# Patient Record
Sex: FEMALE | Race: ASIAN | NOT HISPANIC OR LATINO | URBAN - METROPOLITAN AREA
[De-identification: names, ages, dates, MRNs, and addresses within clinical notes are randomized per-mention and may not be internally consistent; named-entity substitution may affect disease eponyms.]

---

## 2017-12-27 ENCOUNTER — EMERGENCY (EMERGENCY)
Facility: HOSPITAL | Age: 47
LOS: 1 days | Discharge: HOME | End: 2017-12-27
Attending: EMERGENCY MEDICINE | Admitting: EMERGENCY MEDICINE
Payer: MEDICAID

## 2017-12-27 VITALS
SYSTOLIC BLOOD PRESSURE: 140 MMHG | HEART RATE: 88 BPM | DIASTOLIC BLOOD PRESSURE: 78 MMHG | RESPIRATION RATE: 14 BRPM | OXYGEN SATURATION: 98 % | TEMPERATURE: 99 F

## 2017-12-27 VITALS
HEART RATE: 96 BPM | TEMPERATURE: 98 F | HEIGHT: 63 IN | WEIGHT: 186.95 LBS | DIASTOLIC BLOOD PRESSURE: 88 MMHG | SYSTOLIC BLOOD PRESSURE: 152 MMHG | RESPIRATION RATE: 15 BRPM | OXYGEN SATURATION: 99 %

## 2017-12-27 PROCEDURE — 99284 EMERGENCY DEPT VISIT MOD MDM: CPT

## 2017-12-27 PROCEDURE — 99283 EMERGENCY DEPT VISIT LOW MDM: CPT | Mod: 25

## 2017-12-27 PROCEDURE — 73502 X-RAY EXAM HIP UNI 2-3 VIEWS: CPT | Mod: 26,LT

## 2017-12-27 PROCEDURE — 73502 X-RAY EXAM HIP UNI 2-3 VIEWS: CPT

## 2017-12-27 RX ORDER — OXYCODONE AND ACETAMINOPHEN 5; 325 MG/1; MG/1
2 TABLET ORAL ONCE
Qty: 0 | Refills: 0 | Status: DISCONTINUED | OUTPATIENT
Start: 2017-12-27 | End: 2017-12-27

## 2017-12-27 RX ADMIN — OXYCODONE AND ACETAMINOPHEN 2 TABLET(S): 5; 325 TABLET ORAL at 10:24

## 2017-12-27 NOTE — ED ADULT NURSE NOTE - OBJECTIVE STATEMENT
pt aox4, " lt hip area pain for a few days" FROM, good cap refill, denies injury, Hx back problems, on tramadol

## 2017-12-27 NOTE — ED PROVIDER NOTE - CONSTITUTIONAL, MLM
normal... Uncomfortable appearing female, well nourished, awake, alert, oriented to person, place, time/situation and in mild apparent distress.

## 2017-12-27 NOTE — ED ADULT NURSE NOTE - CHIEF COMPLAINT QUOTE
pt with left hip pain, unable to move leg, difficulty walking. deny trauma to hip. pain started last night, history of lumbar pain

## 2017-12-27 NOTE — ED PROVIDER NOTE - OBJECTIVE STATEMENT
48 yo female with one day of aching left hip pain which increases with movement and palpation. No fall and no trauma. No fever or chills. Pain increased with movement and weight bearing.

## 2019-07-16 ENCOUNTER — EMERGENCY (EMERGENCY)
Facility: HOSPITAL | Age: 49
LOS: 1 days | Discharge: HOME | End: 2019-07-16
Attending: EMERGENCY MEDICINE | Admitting: EMERGENCY MEDICINE
Payer: MEDICAID

## 2019-07-16 VITALS
SYSTOLIC BLOOD PRESSURE: 145 MMHG | HEART RATE: 83 BPM | WEIGHT: 190.04 LBS | OXYGEN SATURATION: 99 % | TEMPERATURE: 99 F | RESPIRATION RATE: 17 BRPM | DIASTOLIC BLOOD PRESSURE: 85 MMHG

## 2019-07-16 LAB
ALBUMIN SERPL ELPH-MCNC: 3.8 G/DL — SIGNIFICANT CHANGE UP (ref 3.3–5)
ALP SERPL-CCNC: 90 U/L — SIGNIFICANT CHANGE UP (ref 40–120)
ALT FLD-CCNC: 20 U/L — SIGNIFICANT CHANGE UP (ref 12–78)
ANION GAP SERPL CALC-SCNC: 7 MMOL/L — SIGNIFICANT CHANGE UP (ref 5–17)
AST SERPL-CCNC: 19 U/L — SIGNIFICANT CHANGE UP (ref 15–37)
BILIRUB SERPL-MCNC: 0.3 MG/DL — SIGNIFICANT CHANGE UP (ref 0.2–1.2)
BUN SERPL-MCNC: 10 MG/DL — SIGNIFICANT CHANGE UP (ref 7–23)
CALCIUM SERPL-MCNC: 9.1 MG/DL — SIGNIFICANT CHANGE UP (ref 8.5–10.1)
CHLORIDE SERPL-SCNC: 109 MMOL/L — HIGH (ref 96–108)
CK SERPL-CCNC: 167 U/L — SIGNIFICANT CHANGE UP (ref 26–192)
CO2 SERPL-SCNC: 25 MMOL/L — SIGNIFICANT CHANGE UP (ref 22–31)
CREAT SERPL-MCNC: 0.86 MG/DL — SIGNIFICANT CHANGE UP (ref 0.5–1.3)
D DIMER BLD IA.RAPID-MCNC: <150 NG/ML DDU — SIGNIFICANT CHANGE UP
GLUCOSE SERPL-MCNC: 107 MG/DL — HIGH (ref 70–99)
HCG SERPL-ACNC: <1 MIU/ML — SIGNIFICANT CHANGE UP
HCT VFR BLD CALC: 38.9 % — SIGNIFICANT CHANGE UP (ref 34.5–45)
HGB BLD-MCNC: 12.4 G/DL — SIGNIFICANT CHANGE UP (ref 11.5–15.5)
MCHC RBC-ENTMCNC: 25.7 PG — LOW (ref 27–34)
MCHC RBC-ENTMCNC: 31.9 GM/DL — LOW (ref 32–36)
MCV RBC AUTO: 80.7 FL — SIGNIFICANT CHANGE UP (ref 80–100)
NRBC # BLD: 0 /100 WBCS — SIGNIFICANT CHANGE UP (ref 0–0)
PLATELET # BLD AUTO: 286 K/UL — SIGNIFICANT CHANGE UP (ref 150–400)
POTASSIUM SERPL-MCNC: 4.4 MMOL/L — SIGNIFICANT CHANGE UP (ref 3.5–5.3)
POTASSIUM SERPL-SCNC: 4.4 MMOL/L — SIGNIFICANT CHANGE UP (ref 3.5–5.3)
PROT SERPL-MCNC: 8 G/DL — SIGNIFICANT CHANGE UP (ref 6–8.3)
RBC # BLD: 4.82 M/UL — SIGNIFICANT CHANGE UP (ref 3.8–5.2)
RBC # FLD: 13.2 % — SIGNIFICANT CHANGE UP (ref 10.3–14.5)
SODIUM SERPL-SCNC: 141 MMOL/L — SIGNIFICANT CHANGE UP (ref 135–145)
TROPONIN I SERPL-MCNC: <.015 NG/ML — SIGNIFICANT CHANGE UP (ref 0.01–0.04)
WBC # BLD: 6.62 K/UL — SIGNIFICANT CHANGE UP (ref 3.8–10.5)
WBC # FLD AUTO: 6.62 K/UL — SIGNIFICANT CHANGE UP (ref 3.8–10.5)

## 2019-07-16 PROCEDURE — 99285 EMERGENCY DEPT VISIT HI MDM: CPT

## 2019-07-16 PROCEDURE — 71046 X-RAY EXAM CHEST 2 VIEWS: CPT | Mod: 26

## 2019-07-16 PROCEDURE — 93010 ELECTROCARDIOGRAM REPORT: CPT

## 2019-07-16 PROCEDURE — 70450 CT HEAD/BRAIN W/O DYE: CPT | Mod: 26

## 2019-07-16 RX ORDER — ATORVASTATIN CALCIUM 80 MG/1
0 TABLET, FILM COATED ORAL
Qty: 0 | Refills: 0 | DISCHARGE

## 2019-07-16 RX ORDER — METFORMIN HYDROCHLORIDE 850 MG/1
0 TABLET ORAL
Qty: 0 | Refills: 0 | DISCHARGE

## 2019-07-16 RX ORDER — AMLODIPINE BESYLATE 2.5 MG/1
1 TABLET ORAL
Qty: 0 | Refills: 0 | DISCHARGE

## 2019-07-16 RX ORDER — LOSARTAN POTASSIUM 100 MG/1
1 TABLET, FILM COATED ORAL
Qty: 0 | Refills: 0 | DISCHARGE

## 2019-07-16 NOTE — ED PROVIDER NOTE - ENMT, MLM
Airway patent, Nasal mucosa clear. Mouth with normal mucosa. Throat has no vesicles, no oropharyngeal exudates and uvula is midline. no ext signs of head trauma. No spinal tend.

## 2019-07-16 NOTE — ED PROVIDER NOTE - CARE PROVIDER_API CALL
Orestes Verdin)  Cardiovascular Disease  [address] [place] [address]  Phone: [phone]  Fax: [phone]  Follow Up Time:

## 2019-07-16 NOTE — ED ADULT NURSE NOTE - OBJECTIVE STATEMENT
Patient alert and oriented X 3. Complaining of high blood pressure, headache and dizziness since 1730. Patient state she had a witnessed fall after feeling dizzy, Did not hit head. + right hand pain. Patient blood pressure after fall 180 systolic. Patient took a dose of 100 mg losartan and 10 mg amlodipine and went to urgent care. Patient state she feel like her heart is racing. Denies  shortness of breath, nausea, vomiting, and

## 2019-07-16 NOTE — ED ADULT NURSE NOTE - NSIMPLEMENTINTERV_GEN_ALL_ED
Implemented All Universal Safety Interventions:  [place] to call system. Call bell, personal items and telephone within reach. Instruct patient to call for assistance. Room bathroom lighting operational. Non-slip footwear when patient is off stretcher. Physically safe environment: no spills, clutter or unnecessary equipment. Stretcher in lowest position, wheels locked, appropriate side rails in place.

## 2019-07-16 NOTE — ED PROVIDER NOTE - PROGRESS NOTE DETAILS
Pt doing well, no acute co or changes. Will cont to monitor. seen by cardiology, reshma home, f/u as outpatient

## 2019-07-16 NOTE — ED PROVIDER NOTE - OBJECTIVE STATEMENT
50 yo F p/w was co headache tonight, had episode where she felt dizzy , fell. pt believes she passed out. Pt denies inj. No neck / back pain. pt co persistent mild headache. no n/v. no numb/ting/focal weak. No fever/chills/recent illness. no cp/sob. ? some palpitations during episode tonight. No prior episodes. no agg/allev factors. no other inj or co.  After episode , pt found she was hypertensive, SBP ~ 180. pt took an extra dose of her usual BP meds, not BP improved.

## 2019-07-17 VITALS
RESPIRATION RATE: 16 BRPM | TEMPERATURE: 98 F | OXYGEN SATURATION: 98 % | SYSTOLIC BLOOD PRESSURE: 118 MMHG | DIASTOLIC BLOOD PRESSURE: 78 MMHG

## 2019-07-17 PROBLEM — E78.5 HYPERLIPIDEMIA, UNSPECIFIED: Chronic | Status: ACTIVE | Noted: 2017-12-27

## 2019-07-17 PROBLEM — E11.9 TYPE 2 DIABETES MELLITUS WITHOUT COMPLICATIONS: Chronic | Status: ACTIVE | Noted: 2017-12-27

## 2019-07-17 PROBLEM — I10 ESSENTIAL (PRIMARY) HYPERTENSION: Chronic | Status: ACTIVE | Noted: 2017-12-27

## 2019-07-17 LAB
CK SERPL-CCNC: 128 U/L — SIGNIFICANT CHANGE UP (ref 26–192)
TROPONIN I SERPL-MCNC: <.015 NG/ML — SIGNIFICANT CHANGE UP (ref 0.01–0.04)

## 2019-07-17 PROCEDURE — 84702 CHORIONIC GONADOTROPIN TEST: CPT

## 2019-07-17 PROCEDURE — 80053 COMPREHEN METABOLIC PANEL: CPT

## 2019-07-17 PROCEDURE — 93005 ELECTROCARDIOGRAM TRACING: CPT

## 2019-07-17 PROCEDURE — 99285 EMERGENCY DEPT VISIT HI MDM: CPT

## 2019-07-17 PROCEDURE — 85027 COMPLETE CBC AUTOMATED: CPT

## 2019-07-17 PROCEDURE — 84484 ASSAY OF TROPONIN QUANT: CPT

## 2019-07-17 PROCEDURE — 70450 CT HEAD/BRAIN W/O DYE: CPT

## 2019-07-17 PROCEDURE — 36415 COLL VENOUS BLD VENIPUNCTURE: CPT

## 2019-07-17 PROCEDURE — 71046 X-RAY EXAM CHEST 2 VIEWS: CPT

## 2019-07-17 PROCEDURE — 99284 EMERGENCY DEPT VISIT MOD MDM: CPT | Mod: 25

## 2019-07-17 PROCEDURE — 82550 ASSAY OF CK (CPK): CPT

## 2019-07-17 PROCEDURE — 85379 FIBRIN DEGRADATION QUANT: CPT

## 2019-07-17 NOTE — CONSULT NOTE ADULT - SUBJECTIVE AND OBJECTIVE BOX
[facility]         Rick Anderson, Beni, Velvet, Yanira, Jocelyne Cano        [phone] (office)    Reason for Consult:    Interval HPI: Patient seen and examined at bedside. No acute events overnight.     HPI:  49 F PMH HTN, HLD, type II DM presents to ED s/p fall. Note, daughter present to assist with translation of Mohawk per patient preference. Patient states she was sleeping on the couch yesterday at 5:00 p.m. and she got up and she noticed her vision appeared dark, felt a discomfort in her chest and a severe headache. She went to the fridge to drink a glass of water and eat a small amount of watermelon when she felt lightheaded and fell to the floor. Patient denies LOC, admits to hitting her head. Denies incontinence, post-ictal but admits to worsening headache. At that time, daughter found her and measured blood pressure, finding SBP to be 180. She gave her home dose of amlodipine and losartan (10mg, 100 mg respectively) and took patient to Urgent Care who advised they come to the ED. Patient denies chest pain, SOB, peripheral edema, N/V, decrease PO intake. Patient notes she had similar symptoms before, the last of which being 2 years prior in [place] which resulted in a L broken ankle. [place] a cardiologist [place] per daughter 3 years ago due to extensive family history of CVD, but it's unclear if cardiac workup performed at that time.    PAST MEDICAL & SURGICAL HISTORY:  Hyperlipidemia  Hypertension  Diabetes  No significant past surgical history      SOCIAL HISTORY: No active tobacco, alcohol or illicit drug use    FAMILY HISTORY:  -Early onset stroke in 2 male siblings < 55  -MI in multiple female siblings, age unclear    Home Medications:  amLODIPine 10 mg oral tablet: 1 tab(s) orally once a day (16 Jul 2019 20:32)  atorvastatin:  (16 Jul 2019 20:32)  losartan 100 mg oral tablet: 1 tab(s) orally once a day (16 Jul 2019 20:32)  metFORMIN 500 mg oral tablet: orally once a day (16 Jul 2019 20:32)      MEDICATIONS  (STANDING):    MEDICATIONS  (PRN):      Allergies    No Known Allergies    Intolerances        REVIEW OF SYSTEMS:   Constitutional: denies fever, chills, diaphoresis   HEENT: admits blurry vision, denies eye pain, difficulty hearing  Respiratory: denies SOB, cough, sputum production  Cardiovascular: denies CP, edema admits palpitations  Gastrointestinal: denies nausea, vomiting, diarrhea, constipation, abdominal pain  Genitourinary: denies dysuria, frequency, urgency, hematuria   Neurologic: admits headache, dizziness denies paresthesias, numbness/tingling  Psychiatric: denies anxiety, depression      VITAL SIGNS:   Vital Signs Last 24 Hrs  T(C): 37.1 (17 Jul 2019 05:39), Max: 37.1 (17 Jul 2019 05:39)  T(F): 98.7 (17 Jul 2019 05:39), Max: 98.7 (17 Jul 2019 05:39)  HR: 80 (17 Jul 2019 05:39) (80 - 83)  BP: 125/74 (17 Jul 2019 05:39) (116/85 - 145/85)  BP(mean): --  RR: 16 (17 Jul 2019 05:39) (16 - 17)  SpO2: 97% (17 Jul 2019 05:39) (97% - 100%)    I&O's Summary      PHYSICAL EXAM:  Constitutional: NAD, well-developed  HEENT NC/AT, moist mucous membranes  Pulmonary: Non-labored, breath sounds are clear bilaterally, no wheezing, rales or rhonchi  Cardiovascular: +S1, S2, RRR, no murmur  Gastrointestinal: Soft, nontender, nondistended, normoactive bowel sounds  Extremities: No peripheral edema   Neurological: Alert, strength and sensitivity are grossly intact  Skin: warm, dry  Psych: Mood & affect appropriate    LABS: All Labs Reviewed:                        12.4   6.62  )-----------( 286      ( 16 Jul 2019 21:29 )             38.9     16 Jul 2019 21:29    141    |  109    |  10     ----------------------------<  107    4.4     |  25     |  0.86     Ca    9.1        16 Jul 2019 21:29    TPro  8.0    /  Alb  3.8    /  TBili  0.3    /  DBili  x      /  AST  19     /  ALT  20     /  AlkPhos  90     16 Jul 2019 21:29      CARDIAC MARKERS ( 17 Jul 2019 05:49 )  <.015 ng/mL / x     / 128 U/L / x     / x      CARDIAC MARKERS ( 16 Jul 2019 21:29 )  <.015 ng/mL / x     / 167 U/L / x     / x          Blood Culture:         EKG:  NSR, VR 74, no ST elevations/depressions. No T wave inversions, no prolonged intervals  RADIOLOGY:  CXR:  CXR personally reviewed and my interpretation is: grossly clear lungs, costophrenic angles without blunting [facility]         Rick Anderson, Beni, Velvet, Yanira, Jerome, Jocelyne        [phone] (office)    Reason for Consult:    Interval HPI: Patient seen and examined at bedside. No acute events overnight.     HPI:  49 F PMH HTN, HLD, type II DM presents to ED s/p fall. Note, daughter present to assist with translation of Thai per patient preference. Patient states she was sleeping on the couch yesterday at 5:00 p.m. which was unlike her to nap in the afternoon. She got up and she noticed her vision appeared dark, felt a discomfort in her chest and a severe headache. She went to the fridge to drink a glass of water and eat a small amount of watermelon when she felt lightheaded and fell to the floor. Patient denies LOC, admits to hitting her head. Denies incontinence, post-ictal but admits to worsening headache. At that time, daughter found her and measured blood pressure, finding SBP to be 180. She gave her home dose of amlodipine and losartan (10mg, 100 mg respectively) and took patient to Urgent Care who advised they come to the ED. Patient denies chest pain, SOB, peripheral edema, N/V, decrease PO intake. Patient notes she had similar symptoms three times before, the last of which being 8 months prior and also 2 years ago in [place] which resulted in a L broken ankle. She saw a cardiologist in [place] per daughter 3 years ago due to extensive family history of CVD, and cardiac work up performed at that time including stress test and holter monitor.   PAST MEDICAL & SURGICAL HISTORY:  Hyperlipidemia  Hypertension  Diabetes  No significant past surgical history      SOCIAL HISTORY: No active tobacco, alcohol or illicit drug use    FAMILY HISTORY:  -Early onset stroke in 2 male siblings < 55  -MI in multiple female siblings, age unclear    Home Medications:  amLODIPine 10 mg oral tablet: 1 tab(s) orally once a day (16 Jul 2019 20:32)  atorvastatin:  (16 Jul 2019 20:32)  losartan 100 mg oral tablet: 1 tab(s) orally once a day (16 Jul 2019 20:32)  metFORMIN 500 mg oral tablet: orally once a day (16 Jul 2019 20:32)      MEDICATIONS  (STANDING):    MEDICATIONS  (PRN):      Allergies    No Known Allergies    Intolerances        REVIEW OF SYSTEMS:   Constitutional: denies fever, chills, diaphoresis   HEENT: admits blurry vision, denies eye pain, difficulty hearing  Respiratory: denies SOB, cough, sputum production  Cardiovascular: denies CP, edema admits palpitations  Gastrointestinal: denies nausea, vomiting, diarrhea, constipation, abdominal pain  Genitourinary: denies dysuria, frequency, urgency, hematuria   Neurologic: admits headache, dizziness denies paresthesias, numbness/tingling  Psychiatric: denies anxiety, depression      VITAL SIGNS:   Vital Signs Last 24 Hrs  T(C): 37.1 (17 Jul 2019 05:39), Max: 37.1 (17 Jul 2019 05:39)  T(F): 98.7 (17 Jul 2019 05:39), Max: 98.7 (17 Jul 2019 05:39)  HR: 80 (17 Jul 2019 05:39) (80 - 83)  BP: 125/74 (17 Jul 2019 05:39) (116/85 - 145/85)  BP(mean): --  RR: 16 (17 Jul 2019 05:39) (16 - 17)  SpO2: 97% (17 Jul 2019 05:39) (97% - 100%)    I&O's Summary      PHYSICAL EXAM:  Constitutional: NAD, well-developed  HEENT NC/AT, moist mucous membranes  Pulmonary: Non-labored, breath sounds are clear bilaterally, no wheezing, rales or rhonchi  Cardiovascular: +S1, S2, RRR, no murmur  Gastrointestinal: Soft, nontender, nondistended, normoactive bowel sounds  Extremities: No peripheral edema   Neurological: Alert, strength and sensitivity are grossly intact  Skin: warm, dry  Psych: Mood & affect appropriate    LABS: All Labs Reviewed:                        12.4   6.62  )-----------( 286      ( 16 Jul 2019 21:29 )             38.9     16 Jul 2019 21:29    141    |  109    |  10     ----------------------------<  107    4.4     |  25     |  0.86     Ca    9.1        16 Jul 2019 21:29    TPro  8.0    /  Alb  3.8    /  TBili  0.3    /  DBili  x      /  AST  19     /  ALT  20     /  AlkPhos  90     16 Jul 2019 21:29      CARDIAC MARKERS ( 17 Jul 2019 05:49 )  <.015 ng/mL / x     / 128 U/L / x     / x      CARDIAC MARKERS ( 16 Jul 2019 21:29 )  <.015 ng/mL / x     / 167 U/L / x     / x          Blood Culture:         EKG:  NSR, VR 74, no ST elevations/depressions. No T wave inversions, no prolonged intervals  RADIOLOGY:  CXR:  CXR personally reviewed and my interpretation is: grossly clear lungs, costophrenic angles without blunting

## 2019-07-17 NOTE — ED ADULT NURSE REASSESSMENT NOTE - NS ED NURSE REASSESS COMMENT FT1
pt mentating at baseline denies any dizziness, headache . no change in neuro status. pt denies pain at this time. pt placed in position of comfort, given warm blankets. safety maintained. will continue to monitor.

## 2019-07-17 NOTE — CONSULT NOTE ADULT - ASSESSMENT
49 F PMH HTN, HLD, type II DM presents to ED s/p fall.    Patient admits to seeing cardiologist prior due to HTN, but uncertain if echo/stress test performed.       - Patient is not complaining of any cardiac symptoms at this time  - Troponin negative 2x, and given lack of significant cardiac findings, unlikely to be ischemic event.  - EKG normal. Give history, patient likely experienced acute orthostatic episode secondary to dehydration but cannot rule out arrythmia as patient has had multiple episodes over the past few years  - Patient to follow up in 2-3 weeks outpatient for further cardiologic assessment as stress test/halter monitor may be warranted  - No meaningful evidence of volume overload. 49 F PMH HTN, HLD, type II DM presents to ED s/p fall.    Patient admits to seeing cardiologist prior due to HTN, and patient received holter monitor and stress test. Unclear if results were abnormal at that time.       - Patient is not complaining of any cardiac symptoms at this time  - Troponin negative 2x, and given lack of significant cardiac findings, unlikely to be ischemic event.  - EKG normal. Give history, patient likely experienced acute orthostatic episode secondary to dehydration but cannot rule out arrythmia as patient has had multiple  episodes over the past few years  - Patient to follow up in 2-3 weeks outpatient for further cardiologic assessment as stress test/holter monitor may be warranted  - No meaningful evidence of volume overload. 49 F PMH HTN, HLD, type II DM presents to ED s/p fall.    Patient admits to seeing cardiologist prior due to HTN, and patient received holter monitor and stress test. Unclear if results were abnormal at that time.       - Patient is not complaining of any cardiac symptoms at this time  - Troponin negative 2x, and given lack of significant cardiac findings, unlikely to be ischemic event.  - EKG normal. Give history, patient likely experienced acute orthostatic episode secondary to dehydration but cannot rule out arrythmia as patient has had multiple  episodes over the past few years  - Will perform orthostatics in ED, if negative, patient cleared to follow up outpatient  - Patient to follow up in 2-3 weeks outpatient for further cardiologic assessment as stress test/holter monitor may be warranted  - No meaningful evidence of volume overload. 49 F PMH HTN, HLD, type II DM presents to ED s/p fall.    Patient admits to seeing cardiologist prior due to HTN, and patient received holter monitor and stress test. Unclear if results were abnormal at that time.     -there is no evidence of acute ischemia.  -there is no evidence of significant arrhythmia.  -there is no evidence for meaningful  volume overload.     - Patient is not complaining of any cardiac symptoms at this time  - Troponin negative 2x, and given lack of significant cardiac findings, unlikely to be ischemic event.  - EKG normal. Give history, patient likely experienced acute orthostatic episode secondary to dehydration but cannot rule out arrythmia as patient has had multiple  episodes over the past few years  - Will perform orthostatics in ED, if negative, patient cleared to follow up outpatient  - Patient to follow up in 2-3 weeks outpatient for further cardiologic assessment, though noting a prior eval for these sxs, the yield of additional testing is likely low

## 2019-10-25 NOTE — ED PROVIDER NOTE - CPE EDP RESP NORM
Hospitalist Berny left instructions for this patient to be scheduled with Dr. Griselda Moreno in 3 wks for a hospital follow-up. [facility] on 10/24/19.   Please contact to schedule appointment normal...

## 2021-12-29 NOTE — ED PROVIDER NOTE - CPE EDP [facility] NORM
I reviewed the H&P, I examined the patient, and there are no changes in the patient's condition.    Patient agrees to proceed with the procedure as planned. Risks to the procedure were again discussed including but not limited to bleeding, infection, need for blood transfusion, recurrence of UPJ obstruction, recurrence of nephrolithiasis, retained nephrolithiasis, pyelonephritis, urinary leak, adjacent organ injury, DVT, heart attack and death. Patient agrees to proceed as planned.     
normal...

## 2024-05-05 NOTE — ED ADULT NURSE NOTE - NSSISCREENINGQ4_ED_A_ED
Chief Complaint   Patient presents with    Chest Pain    Shortness of Breath     The pt arrives to the ED due to mid-sternal CP that started earlier in the day. She took a baby aspirin and it went away but then ate dinner tonight and her pain came back. She took another baby aspirin prior to coming into the ED. She also states she has SOB and some nausea but denies vomiting. She is alert and oriented out in triage. EKG completed out in triage.     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        
No